# Patient Record
Sex: MALE | Race: WHITE | NOT HISPANIC OR LATINO | Employment: OTHER | URBAN - METROPOLITAN AREA
[De-identification: names, ages, dates, MRNs, and addresses within clinical notes are randomized per-mention and may not be internally consistent; named-entity substitution may affect disease eponyms.]

---

## 2018-07-02 ENCOUNTER — OFFICE VISIT (OUTPATIENT)
Dept: ORTHOPEDICS | Facility: CLINIC | Age: 68
End: 2018-07-02
Payer: MEDICARE

## 2018-07-02 VITALS
SYSTOLIC BLOOD PRESSURE: 156 MMHG | HEIGHT: 71 IN | HEART RATE: 67 BPM | BODY MASS INDEX: 36.4 KG/M2 | WEIGHT: 260 LBS | DIASTOLIC BLOOD PRESSURE: 88 MMHG

## 2018-07-02 DIAGNOSIS — S43.432A SUPERIOR GLENOID LABRUM LESION OF LEFT SHOULDER, INITIAL ENCOUNTER: ICD-10-CM

## 2018-07-02 DIAGNOSIS — M19.012 PRIMARY OSTEOARTHRITIS OF LEFT SHOULDER: Primary | ICD-10-CM

## 2018-07-02 DIAGNOSIS — M75.102 TEAR OF LEFT ROTATOR CUFF, UNSPECIFIED TEAR EXTENT: ICD-10-CM

## 2018-07-02 PROCEDURE — 99204 OFFICE O/P NEW MOD 45 MIN: CPT | Mod: ,,, | Performed by: ORTHOPAEDIC SURGERY

## 2018-07-02 PROCEDURE — 73030 X-RAY EXAM OF SHOULDER: CPT | Mod: LT,,, | Performed by: ORTHOPAEDIC SURGERY

## 2018-07-02 RX ORDER — LISINOPRIL 40 MG/1
40 TABLET ORAL DAILY
COMMUNITY

## 2018-07-02 RX ORDER — OMEPRAZOLE 40 MG/1
40 CAPSULE, DELAYED RELEASE ORAL EVERY MORNING
COMMUNITY

## 2018-07-02 RX ORDER — PREDNISONE 5 MG/1
5 TABLET ORAL
COMMUNITY

## 2018-07-02 NOTE — PROGRESS NOTES
[facility]    Subjective:     Chief Complaint:   Chief Complaint   Patient presents with    Left Shoulder - Pain     Left shoulder pain x 2 weeks. States that he woke up on morning with it. States that he can not lift his arm up. States that he has pain in the back of his shoulder. States that he did have surgery on that arm a few years ago.       Past Medical History:   Diagnosis Date    Arthritis     RA    Depression     Diabetes mellitus, type 2     Hypertension        Past Surgical History:   Procedure Laterality Date    CERVICAL SPINE SURGERY      KNEE SURGERY      removal of abscesses         Current Outpatient Prescriptions   Medication Sig    lisinopril (PRINIVIL,ZESTRIL) 40 MG tablet Take 40 mg by mouth.    methotrexate sodium 2.5 mg DsPk Take 2.5 mg by mouth.    omeprazole (PRILOSEC) 40 MG capsule Take 40 mg by mouth.    predniSONE (DELTASONE) 5 MG tablet Take 5 mg by mouth.     No current facility-administered medications for this visit.        Review of patient's allergies indicates:   Allergen Reactions    Sulfa (sulfonamide antibiotics) Swelling     Swelling of lips and tongue          History reviewed. No pertinent family history.    Social History     Social History    Marital status:      Spouse name: N/A    Number of children: N/A    Years of education: N/A     Occupational History    Not on file.     Social History Main Topics    Smoking status: Current Every Day Smoker    Smokeless tobacco: Not on file    Alcohol use Yes    Drug use: Unknown    Sexual activity: Not on file     Other Topics Concern    Not on file     Social History Narrative    No narrative on file       History of present illness: Left shoulder pain. Now maybe 20 years ago he had a surgery on the left shoulder for an injury. Was doing well last year and really until the last few weeks he started having issues with the left shoulder. Movement overhead is started to cause pain in left  shoulder. No new injury. He is retired. He is right-handed. He has trouble sleeping on the left shoulder at night.      Review of Systems:    Constitution: Negative for chills, fever, and sweats.  Negative for unexplained weight loss.    HENT:  Negative for headaches and blurry vision.    Cardiovascular:Negative for chest pain or irregular heart beat. Negative for hypertension.    Respiratory:  Negative for cough and shortness of breath.    Gastrointestinal: Negative for abdominal pain, heartburn, melena, nausea, and vomitting.    Genitourinary:  Negative bladder incontinence and dysuria.    Musculoskeletal:  See HPI for details.     Neurological: Negative for numbness.    Psychiatric/Behavioral: Negative for depression.  The patient is not nervous/anxious.      Endocrine: Negative for polyuria    Hematologic/Lymphatic: Negative for bleeding problem.  Does not bruise/bleed easily.    Skin: Negative for poor would healing and rash    Objective:      Physical Examination:    Vital Signs:    Vitals:    07/02/18 1021   BP: (!) 156/88   Pulse: 67       Body mass index is 36.26 kg/m².    This a well-developed, well nourished patient in no acute distress.  They are alert and oriented and cooperative to examination.        Physical exam left shoulder shows a prior longitudinal incision over the ADC joint region. He has a mild tenderness at the distal clavicle. He has no significant apprehension pain. He does have crepitation and a painful arc. He also has pain with full forward flexion of the left shoulder.  Pertinent New Results:    XRAY Report / Interpretation:   AP lateral left shoulder shows that he has hardware in the shoulder. Looks like a dog bone type of distal clavicle and coracoid hardware to fix and ADC repair. He also has a single metal screw anchor at the distal end of the clavicle. I do not see any significant step off at the acromioclavicular joint. I do not see superior migration of the humeral head nor  degenerative changes in the glenohumeral joint.Electronically Signed By Elver Dennis JR, MD    Assessment/Plan:      My impression is a old ADC repair left shoulder. New pain in the shoulder which could be SLAP lesion or rotator cuff pain. He does have a mildly positive speed's test on the left and a painful arc. I don't know if with any get a good picture from an MRI so I'd rather do a CT arthrogram to see if we can better delineate the issues with his left shoulder. He needs surgery on the left shoulder      This note was created using Dragon voice recognition software that occasionally misinterpreted phrases or words.

## 2018-07-09 ENCOUNTER — TELEPHONE (OUTPATIENT)
Dept: ORTHOPEDICS | Facility: CLINIC | Age: 68
End: 2018-07-09

## 2018-07-09 NOTE — TELEPHONE ENCOUNTER
----- Message from Lynda Penny LPN sent at 7/9/2018 10:33 AM CDT -----  He suppose to have some type of test. Not sure what and when  Lm with Patient, his CT arthrogram is pending approval

## 2018-07-27 ENCOUNTER — OFFICE VISIT (OUTPATIENT)
Dept: ORTHOPEDICS | Facility: CLINIC | Age: 68
End: 2018-07-27
Payer: MEDICARE

## 2018-07-27 VITALS
DIASTOLIC BLOOD PRESSURE: 73 MMHG | HEIGHT: 71 IN | SYSTOLIC BLOOD PRESSURE: 127 MMHG | WEIGHT: 200 LBS | HEART RATE: 73 BPM | BODY MASS INDEX: 28 KG/M2

## 2018-07-27 DIAGNOSIS — M75.122 COMPLETE TEAR OF LEFT ROTATOR CUFF: Primary | ICD-10-CM

## 2018-07-27 PROCEDURE — 93010 ELECTROCARDIOGRAM REPORT: CPT | Mod: ,,, | Performed by: INTERNAL MEDICINE

## 2018-07-27 PROCEDURE — 93005 ELECTROCARDIOGRAM TRACING: CPT

## 2018-07-27 PROCEDURE — 99213 OFFICE O/P EST LOW 20 MIN: CPT | Mod: ,,, | Performed by: ORTHOPAEDIC SURGERY

## 2018-07-27 NOTE — PROGRESS NOTES
[facility]    Subjective:     Chief Complaint:   Chief Complaint   Patient presents with    Left Shoulder - Pain     Follow up with left shoulder Arthrogram (18). States that his shoulder is still hurting. States that it has not gotten any worse or any better.        Past Medical History:   Diagnosis Date    Arthritis     RA    Depression     Diabetes mellitus, type 2     Hypertension        Past Surgical History:   Procedure Laterality Date    CERVICAL SPINE SURGERY      KNEE SURGERY      removal of abscesses         Current Outpatient Prescriptions   Medication Sig    lisinopril (PRINIVIL,ZESTRIL) 40 MG tablet Take 40 mg by mouth.    methotrexate sodium 2.5 mg DsPk Take 2.5 mg by mouth.    omeprazole (PRILOSEC) 40 MG capsule Take 40 mg by mouth.    predniSONE (DELTASONE) 5 MG tablet Take 5 mg by mouth.     No current facility-administered medications for this visit.        Review of patient's allergies indicates:   Allergen Reactions    Sulfa (sulfonamide antibiotics) Swelling     Swelling of lips and tongue          History reviewed. No pertinent family history.    Social History     Social History    Marital status:      Spouse name: N/A    Number of children: N/A    Years of education: N/A     Occupational History    Not on file.     Social History Main Topics    Smoking status: Current Every Day Smoker    Smokeless tobacco: Not on file    Alcohol use Yes    Drug use: Unknown    Sexual activity: Not on file     Other Topics Concern    Not on file     Social History Narrative    No narrative on file       History of present illness: We have the MRI no  CT arthrogram left shoulder. Shows hardware from prior coracoclavicular ligament reconstruction. Some widening of the ADC joint. There is a full-thickness rotator cuff tear supraspinatus. There may be some mild arthritic changes in the shoulder and he is rheumatoid patient.      Review of  Systems:    Constitution: Negative for chills, fever, and sweats.  Negative for unexplained weight loss.    HENT:  Negative for headaches and blurry vision.    Cardiovascular:Negative for chest pain or irregular heart beat. Negative for hypertension.    Respiratory:  Negative for cough and shortness of breath.    Gastrointestinal: Negative for abdominal pain, heartburn, melena, nausea, and vomitting.    Genitourinary:  Negative bladder incontinence and dysuria.    Musculoskeletal:  See HPI for details.     Neurological: Negative for numbness.    Psychiatric/Behavioral: Negative for depression.  The patient is not nervous/anxious.      Endocrine: Negative for polyuria    Hematologic/Lymphatic: Negative for bleeding problem.  Does not bruise/bleed easily.    Skin: Negative for poor would healing and rash    Objective:      Physical Examination:    Vital Signs:    Vitals:    07/27/18 1217   BP: 127/73   Pulse: 73       Body mass index is 27.89 kg/m².    This a well-developed, well nourished patient in no acute distress.  They are alert and oriented and cooperative to examination.        Physical exam shows she does not have tenderness over the acromioclavicular joint does have a significant painful arc in that  Pertinent New Results:    XRAY Report / Interpretation:       Assessment/Plan:      So our plan I think he has a rotator cuff tear he has some rheumatoid which may be a factor the old coracoclavicular ligament reconstruction I don't think is a direct issue here. Our plan is arthroscopy left shoulder pain that the rotator cuff tear. He wants to proceed with that history physical and consent for arthroscopy with rotator cuff repair left shoulder today.      This note was created using Dragon voice recognition software that occasionally misinterpreted phrases or words.

## 2018-07-30 ENCOUNTER — HOSPITAL ENCOUNTER (OUTPATIENT)
Dept: RADIOLOGY | Facility: HOSPITAL | Age: 68
Discharge: HOME | End: 2018-07-30
Attending: ORTHOPAEDIC SURGERY
Payer: MEDICARE

## 2018-07-30 ENCOUNTER — ANESTHESIA EVENT (OUTPATIENT)
Dept: SURGERY | Facility: HOSPITAL | Age: 68
End: 2018-07-30

## 2018-07-30 ENCOUNTER — TELEPHONE (OUTPATIENT)
Dept: ORTHOPEDICS | Facility: CLINIC | Age: 68
End: 2018-07-30

## 2018-07-30 ENCOUNTER — HOSPITAL ENCOUNTER (OUTPATIENT)
Dept: PREADMISSION TESTING | Facility: HOSPITAL | Age: 68
Discharge: HOME | End: 2018-07-30
Attending: ORTHOPAEDIC SURGERY
Payer: MEDICARE

## 2018-07-30 VITALS — BODY MASS INDEX: 28.02 KG/M2 | WEIGHT: 200.13 LBS | HEIGHT: 71 IN

## 2018-07-30 DIAGNOSIS — M75.122 COMPLETE TEAR OF LEFT ROTATOR CUFF: ICD-10-CM

## 2018-07-30 LAB
ANION GAP SERPL CALC-SCNC: 7 MMOL/L
BASOPHILS # BLD AUTO: 0 K/UL
BASOPHILS NFR BLD: 0.1 %
BUN SERPL-MCNC: 23 MG/DL
CALCIUM SERPL-MCNC: 9.4 MG/DL
CHLORIDE SERPL-SCNC: 100 MMOL/L
CO2 SERPL-SCNC: 25 MMOL/L
CREAT SERPL-MCNC: 1.6 MG/DL
DIFFERENTIAL METHOD: ABNORMAL
EOSINOPHIL # BLD AUTO: 0.1 K/UL
EOSINOPHIL NFR BLD: 0.8 %
ERYTHROCYTE [DISTWIDTH] IN BLOOD BY AUTOMATED COUNT: 14.2 %
EST. GFR  (AFRICAN AMERICAN): 50 ML/MIN/1.73 M^2
EST. GFR  (NON AFRICAN AMERICAN): 43 ML/MIN/1.73 M^2
GIANT PLATELETS BLD QL SMEAR: PRESENT
GLUCOSE SERPL-MCNC: 382 MG/DL
HCT VFR BLD AUTO: 41 %
HGB BLD-MCNC: 13.6 G/DL
LYMPHOCYTES # BLD AUTO: 1.5 K/UL
LYMPHOCYTES NFR BLD: 11.2 %
MCH RBC QN AUTO: 31.1 PG
MCHC RBC AUTO-ENTMCNC: 33.1 G/DL
MCV RBC AUTO: 94 FL
MONOCYTES # BLD AUTO: 0.7 K/UL
MONOCYTES NFR BLD: 5.4 %
NEUTROPHILS # BLD AUTO: 10.9 K/UL
NEUTROPHILS NFR BLD: 82.5 %
PLATELET # BLD AUTO: 136 K/UL
PLATELET BLD QL SMEAR: ABNORMAL
PMV BLD AUTO: 10.3 FL
POTASSIUM SERPL-SCNC: 4.7 MMOL/L
RBC # BLD AUTO: 4.37 M/UL
SODIUM SERPL-SCNC: 132 MMOL/L
WBC # BLD AUTO: 13.2 K/UL

## 2018-07-30 PROCEDURE — 71046 X-RAY EXAM CHEST 2 VIEWS: CPT | Mod: 26,,, | Performed by: RADIOLOGY

## 2018-07-30 PROCEDURE — 80048 BASIC METABOLIC PNL TOTAL CA: CPT

## 2018-07-30 PROCEDURE — 36415 COLL VENOUS BLD VENIPUNCTURE: CPT

## 2018-07-30 PROCEDURE — 99900103 DSU ONLY-NO CHARGE-INITIAL HR (STAT)

## 2018-07-30 PROCEDURE — 71046 X-RAY EXAM CHEST 2 VIEWS: CPT | Mod: TC,FY

## 2018-07-30 PROCEDURE — 99900104 DSU ONLY-NO CHARGE-EA ADD'L HR (STAT)

## 2018-07-30 PROCEDURE — 85025 COMPLETE CBC W/AUTO DIFF WBC: CPT

## 2018-07-30 RX ORDER — METFORMIN HYDROCHLORIDE 500 MG/1
500 TABLET ORAL DAILY
COMMUNITY

## 2018-07-30 NOTE — TELEPHONE ENCOUNTER
Spoke with pt and explained his surgery has been canceled by Dr. Dennis for tomorrow due to his glucose of 384 and WBC 13.2. He will need to see his PCP and get a clearance before he can be put back on the surgery schedule

## 2018-07-30 NOTE — ANESTHESIA PREPROCEDURE EVALUATION
07/30/2018  Israel Perdue is a 67 y.o., male.    Pre-op Assessment    I have reviewed the Patient Summary Reports.     I have reviewed the Nursing Notes.   I have reviewed the Medications.     Review of Systems

## 2018-07-30 NOTE — TELEPHONE ENCOUNTER
----- Message from Arlene Kwon sent at 7/30/2018  2:10 PM CDT -----  Contact: franki  Patient called is returning her call [phone]

## 2018-07-31 ENCOUNTER — ANESTHESIA (OUTPATIENT)
Dept: SURGERY | Facility: HOSPITAL | Age: 68
End: 2018-07-31

## 2019-04-09 ENCOUNTER — OFFICE VISIT (OUTPATIENT)
Dept: RHEUMATOLOGY | Facility: CLINIC | Age: 69
End: 2019-04-09
Payer: COMMERCIAL

## 2019-04-09 VITALS
BODY MASS INDEX: 27.81 KG/M2 | SYSTOLIC BLOOD PRESSURE: 133 MMHG | DIASTOLIC BLOOD PRESSURE: 76 MMHG | WEIGHT: 199.38 LBS

## 2019-04-09 DIAGNOSIS — M19.90 OSTEOARTHRITIS, UNSPECIFIED OSTEOARTHRITIS TYPE, UNSPECIFIED SITE: ICD-10-CM

## 2019-04-09 DIAGNOSIS — I10 HYPERTENSION, UNSPECIFIED TYPE: ICD-10-CM

## 2019-04-09 DIAGNOSIS — E13.8 DIABETES MELLITUS OF OTHER TYPE WITH COMPLICATION, UNSPECIFIED WHETHER LONG TERM INSULIN USE: ICD-10-CM

## 2019-04-09 DIAGNOSIS — M05.79 RHEUMATOID ARTHRITIS INVOLVING MULTIPLE SITES WITH POSITIVE RHEUMATOID FACTOR: Primary | ICD-10-CM

## 2019-04-09 DIAGNOSIS — J44.9 CHRONIC OBSTRUCTIVE PULMONARY DISEASE, UNSPECIFIED COPD TYPE: ICD-10-CM

## 2019-04-09 DIAGNOSIS — Z79.899 ENCOUNTER FOR LONG-TERM (CURRENT) DRUG USE: ICD-10-CM

## 2019-04-09 LAB
ALBUMIN SERPL-MCNC: 3.9 G/DL (ref 3.1–4.7)
ALP SERPL-CCNC: 53 IU/L (ref 40–104)
ALT (SGPT): 14 IU/L (ref 3–33)
AST SERPL-CCNC: 19 IU/L (ref 10–40)
BASOPHILS NFR BLD: 0 K/UL (ref 0–0.2)
BASOPHILS NFR BLD: 0.4 %
BILIRUB SERPL-MCNC: 0.5 MG/DL (ref 0.3–1)
BUN SERPL-MCNC: 32 MG/DL (ref 8–20)
CALCIUM SERPL-MCNC: 9.3 MG/DL (ref 7.7–10.4)
CHLORIDE: 106 MMOL/L (ref 98–110)
CK SERPL-CCNC: 82 IU/L (ref 18–170)
CO2 SERPL-SCNC: 18.3 MMOL/L (ref 22.8–31.6)
CREATININE: 1.31 MG/DL (ref 0.6–1.4)
CRP SERPL-MCNC: 0.2 MG/DL (ref 0–1.4)
EOSINOPHIL NFR BLD: 0.2 K/UL (ref 0–0.7)
EOSINOPHIL NFR BLD: 1.5 %
ERYTHROCYTE [DISTWIDTH] IN BLOOD BY AUTOMATED COUNT: 13.6 % (ref 11.7–14.9)
GLUCOSE: 123 MG/DL (ref 70–99)
GRAN #: 8.1 K/UL (ref 1.4–6.5)
GRAN%: 76.6 %
HCT VFR BLD AUTO: 41.8 % (ref 39–55)
HGB BLD-MCNC: 14.2 G/DL (ref 14–16)
IMMATURE GRANS (ABS): 0.1 K/UL (ref 0–1)
IMMATURE GRANULOCYTES: 0.8 %
LYMPH #: 1.5 K/UL (ref 1.2–3.4)
LYMPH%: 14.2 %
MCH RBC QN AUTO: 31.1 PG (ref 25–35)
MCHC RBC AUTO-ENTMCNC: 34 G/DL (ref 31–36)
MCV RBC AUTO: 91.5 FL (ref 80–100)
MONO #: 0.7 K/UL (ref 0.1–0.6)
MONO%: 6.5 %
NUCLEATED RBCS: 0 %
PLATELET # BLD AUTO: 123 K/UL (ref 140–440)
PMV BLD AUTO: 11.9 FL (ref 8.8–12.7)
POTASSIUM SERPL-SCNC: 5.1 MMOL/L (ref 3.5–5)
PROT SERPL-MCNC: 7.8 G/DL (ref 6–8.2)
RBC # BLD AUTO: 4.57 M/UL (ref 4.3–5.9)
SODIUM: 134 MMOL/L (ref 134–144)
URATE SERPL-MCNC: 5.4 MG/DL (ref 2.6–7.8)
WBC # BLD AUTO: 10.6 K/UL (ref 5–10)

## 2019-04-09 PROCEDURE — 99204 PR OFFICE/OUTPT VISIT, NEW, LEVL IV, 45-59 MIN: ICD-10-PCS | Mod: ,,, | Performed by: INTERNAL MEDICINE

## 2019-04-09 PROCEDURE — 99204 OFFICE O/P NEW MOD 45 MIN: CPT | Mod: ,,, | Performed by: INTERNAL MEDICINE

## 2019-04-09 RX ORDER — ALLOPURINOL 100 MG/1
100 TABLET ORAL
COMMUNITY

## 2019-04-09 RX ORDER — SIMVASTATIN 10 MG/1
80 TABLET, FILM COATED ORAL
COMMUNITY

## 2019-04-09 RX ORDER — FOLIC ACID 1 MG/1
1 TABLET ORAL DAILY
COMMUNITY

## 2019-04-09 RX ORDER — ACETAMINOPHEN 500 MG
500 TABLET ORAL EVERY 6 HOURS PRN
COMMUNITY

## 2019-04-09 RX ORDER — TOPIRAMATE 100 MG/1
TABLET, FILM COATED ORAL
Refills: 5 | COMMUNITY
Start: 2019-04-02

## 2019-04-09 NOTE — PROGRESS NOTES
[facility] R[facility]OLOG[facility]           New patient visit    Notes dictated via Dragon to EPIC. Please forgive any unintentional errors.  Subjective:       Patient ID:   NAME: Israel Perdue : 1950     68 y.o. male    [facility]  Other Physicians:    Chief Complaint:  Rheumatoid Arthritis        HPI:  This patient was diagnosed by my partner approximately 6 years ago with rheumatoid arthritis. He was placed on methotrexate o which she was partially responsive. At his last visit on 2019 is methotrexate was increased to 25 mg weekly. At that point, the patient was lost to follow-up. He tells me that [facility] required [facility] in [place]. He states that his medication has never been changed & that he remains on methotrexate 25 mg weekly, prednisone 10 mg daily and folate supplementation. At present, he complains only of occasional swelling of the wrist, generally related to overuse. He has brief morning stiffness that is more suggestive of gelling than of active rheumatoid. He is able to perform all of his chores, hobbies, and ADLs without assistance.    His past medical history is positive for hypertension, hyperlipidemia, diabetes, gout, and reflux. He has been a lifelong smoker and acknowledges a likelihood that he has underlying lung disease though he has not sought treatment nor has he been referred for evaluation.      ROS:   GEN:      no fever, night sweats or weight loss  SKIN:     no rashes , erythema, bruising, or swelling, no Raynauds, no photosensitivity  HEENT:  no acute changes in vision, no mouth ulcers, no sicca symptoms, no scalp tenderness, jaw claudication.  CV:        no CP, PND, + NAGELES, no orthopnea, no palpitations  PULM:   no SOB, + productive cough, - hemoptysis, + AM yellowish sputum, no pleuritic pain  GI:          No dysphagia, + GERD, no abdominal pain, nausea, vomiting, constipation, diarrhea, melanotic stools, BRBPR, or  hematemesis.  :        no hematuria, dysuria. No STDs  NEURO: no paresthesias, headaches, visual disturbances, muscle weakness  MUSCULOSKELETAL:  Joint pain and stiffness with occasional swelling of the right wrist only  PSYCH: +/- insomnia, + depression > anxiety    Medications:    Current Outpatient Medications:     acetaminophen (TYLENOL) 500 MG tablet, Take 500 mg by mouth every 6 (six) hours as needed for Pain., Disp: , Rfl:     allopurinol (ZYLOPRIM) 100 MG tablet, Take 100 mg by mouth., Disp: , Rfl:     folic acid (FOLVITE) 1 MG tablet, Take 1 mg by mouth once daily., Disp: , Rfl:     lisinopril (PRINIVIL,ZESTRIL) 40 MG tablet, Take 40 mg by mouth once daily. , Disp: , Rfl:     metFORMIN (GLUCOPHAGE) 500 MG tablet, Take 500 mg by mouth once daily., Disp: , Rfl:     methotrexate sodium 2.5 mg DsPk, Take 2.5 mg by mouth once a week. , Disp: , Rfl:     omeprazole (PRILOSEC) 40 MG capsule, Take 40 mg by mouth every morning. , Disp: , Rfl:     predniSONE (DELTASONE) 5 MG tablet, Take 5 mg by mouth., Disp: , Rfl:     simvastatin (ZOCOR) 10 MG tablet, Take 10 mg by mouth., Disp: , Rfl:     topiramate (TOPAMAX) 100 MG tablet, , Disp: , Rfl: 5    FAMILY HISTORY: negative for Connective Tissue Disease        Review of patient's allergies indicates:   Allergen Reactions    Sulfa (sulfonamide antibiotics) Swelling     Swelling of lips and tongue       Duricef [cefadroxil]              Objective:     Vitals:  Blood pressure 133/76, weight 90.4 kg (199 lb 6.4 oz).    Physical Examination:   GEN:     wn/wd male in no apparent distress  SKIN:    no rashes, no sclerodactyly, no Raynaud's, no periungual erythema, no digital tip tapering, no nailbed pitting  HEAD:   no alopecia, no scalp tenderness, no temporal artery tenderness or induration.  EYES:   no pallor, no icterus, PERRLA  ENT:     no thrush, no mucosal dryness or ulcerations, poor oral hygiene & dentition.  NECK:  supple x 6, no masses, no thyromegaly,  no lymphadenopathy.  CV:        S1 and S2 regular, no murmurs, gallop or rubs  CHEST: Normal respiratory effort;  Coarse biphasic rhonchi and expiratory wheezing diffusely. No signs of consolidation.  ABD:      non-tender and non-distended; soft; normal bowel sounds; no rebound, guarding, or tenderness. No hepatosplenomegaly.  Musculoskeletal:  Low-grade synovial proliferation is noted in the right wrist and in the right second, third, and fourth MCP joints and in the right third PIP joint. Mild evidence of chronic proliferation is noted in the left second and third MCP joints only. Range of motion of both hands is well preserved.  strength is normal. There is no evidence of neurovascular compromise. Here is no evidence of active synovitis or a significant deformity of the large joints. Degenerative changes however are noted in both knees. Muscle strength is adequate. Range of motion of the back is limited by deconditioning. Posture is acceptable.The gait is stable  EXTREM: no clubbing, cyanosis or edema. normal pulses.  NEURO:  grossly intact; motor/sensory WNL; no tremors  PSYCH:  normal mood, affect and behavior            Labs:   Lab Results   Component Value Date    WBC 13.20 (H) 07/30/2018    HGB 13.6 (L) 07/30/2018    HCT 41.0 07/30/2018    MCV 94 07/30/2018     (L) 07/30/2018   CMP@  Sodium   Date Value Ref Range Status   07/30/2018 132 (L) 136 - 145 mmol/L Final     Potassium   Date Value Ref Range Status   07/30/2018 4.7 3.5 - 5.1 mmol/L Final     Chloride   Date Value Ref Range Status   07/30/2018 100 95 - 110 mmol/L Final     CO2   Date Value Ref Range Status   07/30/2018 25 23 - 29 mmol/L Final     Glucose   Date Value Ref Range Status   07/30/2018 382 (H) 70 - 110 mg/dL Final     BUN, Bld   Date Value Ref Range Status   07/30/2018 23 8 - 23 mg/dL Final     Creatinine   Date Value Ref Range Status   07/30/2018 1.6 (H) 0.5 - 1.4 mg/dL Final     Calcium   Date Value Ref Range Status    07/30/2018 9.4 8.7 - 10.5 mg/dL Final         Radiology/Diagnostic Studies:    none    Assessment/Discussion/Plan:   68 y.o. male with chronic rheumatoid arthritis on long-term full dose methotrexate and prednisone 10 mg daily- smoldering  2) HTN- presently controlled lisinopril 40 mg daily  3) DM- on metformin, no recent labs available  4) COPD with continuous tobacco abuse    PLAN:  I will continue his medication unchanged. I have ordered all appropriate blood testing. I will consider adjusting his regimen in part based on those results. He is probably a good candidate for biologics.   I have counseled him on the risk of severe lung damage from continued smoking In particular, we discussed Rheumatoid Lung. I have also asked him to consider seeing a pulmonologist. I will press the issue at the next visit.      RTC:  I will see him back in 2-3 months.      Electronically signed by Arvin Jorge MD

## 2019-04-09 NOTE — LETTER
April 9, 2019      [facility]  [address] [facility] [address]           [facility] - Rheumatology  [address] [address]  Phone: [phone]  Fax: [phone]          Patient: Israel Perdue   MR Number: [id number]   YOB: 1950   Date of Visit: 4/9/2019       Dear [facility]:    Thank you for referring Israel Perdue to me for evaluation. Attached you will find relevant portions of my assessment and plan of care.    If you have questions, please do not hesitate to call me. I look forward to following Israel Perdue along with you.    Sincerely,    Arvin Jorge MD    Enclosure  CC:  No Recipients    If you would like to receive this communication electronically, please contact [email or web address] or [phone] to request more information on [employer] Link access.    For providers and/or their staff who would like to refer a patient to [facility], please contact us through our one-stop-shop provider referral line, [facility] , at [phone].    If you feel you have received this communication in error or would no longer like to receive these types of communications, please e-mail [email or web address]

## 2019-04-11 LAB — RHEUMATOID FACT SERPL-ACNC: 181.4 IU/ML (ref 0–13.9)

## 2019-04-12 LAB
CCP ANTIBODIES IGG/IGA: 154 UNITS (ref 0–19)
MISCELLANEOUS LAB TEST ORDER: NORMAL

## 2022-08-29 ENCOUNTER — TELEPHONE (OUTPATIENT)
Dept: PAIN MEDICINE | Facility: CLINIC | Age: 72
End: 2022-08-29
Payer: MEDICARE

## 2022-08-29 ENCOUNTER — OFFICE VISIT (OUTPATIENT)
Dept: NEUROSURGERY | Facility: CLINIC | Age: 72
End: 2022-08-29
Payer: MEDICARE

## 2022-08-29 VITALS — HEART RATE: 70 BPM | SYSTOLIC BLOOD PRESSURE: 160 MMHG | DIASTOLIC BLOOD PRESSURE: 74 MMHG

## 2022-08-29 DIAGNOSIS — M54.16 LUMBAR RADICULOPATHY: ICD-10-CM

## 2022-08-29 DIAGNOSIS — M48.062 LUMBAR STENOSIS WITH NEUROGENIC CLAUDICATION: ICD-10-CM

## 2022-08-29 DIAGNOSIS — M51.36 DDD (DEGENERATIVE DISC DISEASE), LUMBAR: Primary | ICD-10-CM

## 2022-08-29 DIAGNOSIS — M43.17 ACQUIRED SPONDYLOLISTHESIS OF LUMBOSACRAL REGION: ICD-10-CM

## 2022-08-29 PROCEDURE — 1125F PR PAIN SEVERITY QUANTIFIED, PAIN PRESENT: ICD-10-PCS | Mod: CPTII,S$GLB,, | Performed by: NEUROLOGICAL SURGERY

## 2022-08-29 PROCEDURE — 1101F PR PT FALLS ASSESS DOC 0-1 FALLS W/OUT INJ PAST YR: ICD-10-PCS | Mod: CPTII,S$GLB,, | Performed by: NEUROLOGICAL SURGERY

## 2022-08-29 PROCEDURE — 3077F PR MOST RECENT SYSTOLIC BLOOD PRESSURE >= 140 MM HG: ICD-10-PCS | Mod: CPTII,S$GLB,, | Performed by: NEUROLOGICAL SURGERY

## 2022-08-29 PROCEDURE — 3288F FALL RISK ASSESSMENT DOCD: CPT | Mod: CPTII,S$GLB,, | Performed by: NEUROLOGICAL SURGERY

## 2022-08-29 PROCEDURE — 3077F SYST BP >= 140 MM HG: CPT | Mod: CPTII,S$GLB,, | Performed by: NEUROLOGICAL SURGERY

## 2022-08-29 PROCEDURE — 3078F DIAST BP <80 MM HG: CPT | Mod: CPTII,S$GLB,, | Performed by: NEUROLOGICAL SURGERY

## 2022-08-29 PROCEDURE — 1125F AMNT PAIN NOTED PAIN PRSNT: CPT | Mod: CPTII,S$GLB,, | Performed by: NEUROLOGICAL SURGERY

## 2022-08-29 PROCEDURE — 3288F PR FALLS RISK ASSESSMENT DOCUMENTED: ICD-10-PCS | Mod: CPTII,S$GLB,, | Performed by: NEUROLOGICAL SURGERY

## 2022-08-29 PROCEDURE — 1101F PT FALLS ASSESS-DOCD LE1/YR: CPT | Mod: CPTII,S$GLB,, | Performed by: NEUROLOGICAL SURGERY

## 2022-08-29 PROCEDURE — 99204 OFFICE O/P NEW MOD 45 MIN: CPT | Mod: S$GLB,,, | Performed by: NEUROLOGICAL SURGERY

## 2022-08-29 PROCEDURE — 99204 PR OFFICE/OUTPT VISIT, NEW, LEVL IV, 45-59 MIN: ICD-10-PCS | Mod: S$GLB,,, | Performed by: NEUROLOGICAL SURGERY

## 2022-08-29 PROCEDURE — 3078F PR MOST RECENT DIASTOLIC BLOOD PRESSURE < 80 MM HG: ICD-10-PCS | Mod: CPTII,S$GLB,, | Performed by: NEUROLOGICAL SURGERY

## 2022-08-29 NOTE — TELEPHONE ENCOUNTER
LVM to schedule direct procedure from Dr. Hernandez.       Okay to schedule.       ----- Message -----   From: Kat Owens   Sent: 2022   2:30 PM CDT   To: Tamanna Gan MD   Subject: FW: Order for SHAWN FARRELL                  Ok to schedule ?    ----- Message -----   From: Chante Hernandez DO   Sent: 2022   2:17 PM CDT   To: [facility] Pain Management Schedulers   Subject: Order for SHAWN FARRELL                         Patient Name: SHAWN FARRELL([id number])   Sex: Male   : 1950        PCP: TONY TERESA     Center: [facility]       Types of orders made on 2022: Neurology, Procedure Request      Order Date:2022   Ordering User:CHANTE HERNANDEZ [[id number]   Encounter Provider:Chante Hernandez DO [[id number]]   Authorizing JANICE selfder: Chante Hernandez DO [[id number]]   Department:[facility] NEUROSURGERY[[id number]]      Common Order Information   Procedure -> Epidural Injection (specify level)       Order Specific Information   Order: Procedure Order to Pain Management [Custom: [id number]]  Order #:           [id number]: 1 FUTURE     Priority: Routine  Class: Clinic Performed     Future Order Information       Expires on:2023             Z1   Expected by:2022                    Associated Diagnoses       M51.36 DDD (degenerative disc disease), lumbar       M54.16 Lumbar radiculopathy       M43.17 Acquired spondylolisthesis of lumbosacral region       Physician -> Dr. Gan Cmt: L5-S1 SYDNEY. If no relief, then Right L3-4, L4-5                  TESi          Facility Name: -> [place]               Priority: Routine  Class: Clinic Performed     Future Order Information          Expires on:2023            Expected by:2022                    Associated Diagnoses       M51.36 DDD (degenerative disc disease), lumbar       M54.16 Lumbar radiculopathy       M43.17 Acquired spondylolisthesis of lumbosacral region       Procedure -> Epidural Injection (specify level)            Physician -> Dr. Gan Cmt: L5-S1 SYDNEY. If no relief, then Right L3-4, L4-5                  TESi          Facility Name: -> [facility]

## 2022-08-29 NOTE — PROGRESS NOTES
HPI:  This is a very pleasant 71-year-old gentleman who presents with right lumbosacral pain with radiation into the right buttock, groin, medial aspect of the anterior thigh into the knee.  He describes aching burning pain worse with walking or standing.  His symptoms began abruptly in November 2021 after getting out of bed.  He endorses difficulty getting out of a car or climbing stairs due to proximal right leg pain with weakness.  He underwent physical therapy which made his symptoms worse.  He denies weakness, numbness, saddle anesthesia, sphincter dysfunction.    Smoking status:  Active smoker  Past Medical History:   Diagnosis Date    Arthritis     RA    Depression     Diabetes mellitus, type 2     Hypertension     Presence of dental bridge     upper and lower      Past Surgical History:   Procedure Laterality Date    CERVICAL SPINE SURGERY      KNEE SURGERY      removal of abscesses       Social History     Tobacco Use    Smoking status: Every Day     Packs/day: 0.75     Types: Cigarettes    Smokeless tobacco: Never   Substance Use Topics    Alcohol use: Yes    Drug use: No       Review of Systems: All systems reviewed and are as above or otherwise negative.    General: well developed, well nourished, no distress.   Head: normocephalic, atraumatic  Eyes: pupils equal, round, reactive to light with accomodation, EOMI.   Neck: trachea midline. No JVD  Cardiovascular: No LE edema   Pulmonary: normal respirations, no signs of respiratory distress  Abdomen: soft, non-distended, not tender to palpation  Sensory: intact to light touch throughout  Extremities: No bruising, deformity  Skin: Skin is warm, dry and intact.    Motor Strength: Moves all extremities spontaneously with good tone.  Full strength upper and lower extremities. No abnormal movements seen.     Strength  Deltoids Triceps Biceps Wrist Extension Wrist Flexion Hand    Upper: R 5/5 5/5 5/5 5/5 5/5 5/5    L 5/5 5/5 5/5 5/5 5/5 5/5     Iliopsoas  Quadriceps Knee  Flexion Tibialis  anterior Gastro- cnemius EHL   Lower: R 5/5 5/5 5/5 5/5 5/5 5/5    L 5/5 5/5 5/5 5/5 5/5 5/5     Neurologic: Alert and oriented. Thought content appropriate.  GCS: Motor: 6/Verbal: 5/Eyes: 4 GCS Total: 15  Mental Status: Awake, Alert, Oriented x 4  Language: No aphasia  Speech: No dysarthria  Cranial nerves: face symmetric, tongue midline, CN II-XII grossly intact.     Pronator Drift: no drift noted  Finger-to-nose: Intact bilaterally  DTR's: 2 + and symmetric in UE and LE  Goyal: absent  Clonus: absent  Babinski: absent    Gait: normal    Tandem Gait: No difficulty           Able to walk on heels & toes    Cervical Spine  ROM: full    Nontender to palpation    Lumbar Spine   ROM: full   Nontender to palpation    Pain on Hip ROM: Negative  Straight leg raise: negative bilaterally     SI Joint tenderness: Negative bilaterally   JONNATHAN: Negative bilaterally  Thigh Thrust: Negative bilaterally  Gaenslen: Negative bilaterally    Significant Exam Findings:  Right iliopsoas graded 4/5.  Diminished right patellar reflex. Tender to palpation right lumbosacral region    Significant Radiology Findings:  I reviewed CT lumbar June 6, 2022 and MRI lumbar March 2022 both performed at outside facilities.  Advanced degenerative disc disease L3-4 L5-S1 with coronal scoliosis.  Near disc space collapse at L3-4 and L5-S1.  Bony bridging evident right L3 and L4 vertebral bodies.  Grade 1 spondylolisthesis L4 on 5.  Severe bilateral foraminal stenosis L3-4.  Moderate canal or foraminal stenosis L4-5.  Large right paracentral disc protrusion L5-S1 with impingement of the traversing right S1 nerve root.    Analysis:  He will likely benefit from surgical intervention.  I ordered a EMG nerve conductions of the lower extremities.  I also recommended sequential interventional pain procedures with Dr. Gan for both therapeutic and diagnostic purposes.  He endorses primarily pain in the lumbosacral junction  which correlates best with the degenerative disc disease without bony bridging at L5-S1.  His radicular symptoms are more consistent with the pathology at L3-4 and/or L4-5.  Consequently, I initially recommended a right L5-S1 interlaminar SYDNEY.  If no relief, follow with right L3-4 and L4-5 transforaminal epidural steroid injection.   Given the extensive pathology, my goal would be to identify the most likely source of his symptoms followed by a limited surgical procedure.  I did explain that decompression only at L4-5 may lead worsened spondylolisthesis and associated axial back pain.  He voiced understanding and is agreement with the above recommendations.  I will see him back in 4-6 weeks for re-evaluation.      [unfilled]   Israel was seen today for back pain and leg pain.    Diagnoses and all orders for this visit:    DDD (degenerative disc disease), lumbar  -     Procedure Order to Pain Management; Future  -     EMG W/ ULTRASOUND AND NERVE CONDUCTION TEST 2 Extremities; Future    Lumbar stenosis with neurogenic claudication    Lumbar radiculopathy  -     Procedure Order to Pain Management; Future  -     EMG W/ ULTRASOUND AND NERVE CONDUCTION TEST 2 Extremities; Future    Acquired spondylolisthesis of lumbosacral region  -     Procedure Order to Pain Management; Future  -     EMG W/ ULTRASOUND AND NERVE CONDUCTION TEST 2 Extremities; Future         Follow up in about 4 weeks (around 9/26/2022).

## 2022-08-30 ENCOUNTER — TELEPHONE (OUTPATIENT)
Dept: PAIN MEDICINE | Facility: CLINIC | Age: 72
End: 2022-08-30
Payer: MEDICARE

## 2022-08-30 DIAGNOSIS — M51.36 DDD (DEGENERATIVE DISC DISEASE), LUMBAR: Primary | ICD-10-CM

## 2022-08-30 NOTE — TELEPHONE ENCOUNTER
----- Message from Tomas Peña sent at 8/30/2022  9:16 AM CDT -----  Contact: pt at [phone]  Type:  Patient Returning Call    Who Called:  pt  Who Left Message for Patient:  Kat  Does the patient know what this is regarding?:  yes  Best Call Back Number:  [phone]    Additional Information:  Please call back and advise.

## 2022-09-12 ENCOUNTER — TELEPHONE (OUTPATIENT)
Dept: PHYSICAL MEDICINE AND REHAB | Facility: CLINIC | Age: 72
End: 2022-09-12
Payer: MEDICARE

## 2022-09-12 NOTE — TELEPHONE ENCOUNTER
----- Message from [place] sent at 9/12/2022 12:42 PM CDT -----  Contact: Pt  Type:  Patient Returning Call    Who Called: Pt  Who Left Message for Patient:  Kat  Does the patient know what this is regarding?: Yes an appt r/s  Best Call Back Number:  [phone]    Additional Information: Pt was returning call they missed pt stated to please call back when available Thank you  Please Advise- Thank you

## 2022-09-20 ENCOUNTER — OFFICE VISIT (OUTPATIENT)
Dept: PHYSICAL MEDICINE AND REHAB | Facility: CLINIC | Age: 72
End: 2022-09-20
Payer: MEDICARE

## 2022-09-20 ENCOUNTER — TELEPHONE (OUTPATIENT)
Dept: PAIN MEDICINE | Facility: CLINIC | Age: 72
End: 2022-09-20
Payer: MEDICARE

## 2022-09-20 DIAGNOSIS — M51.36 DDD (DEGENERATIVE DISC DISEASE), LUMBAR: ICD-10-CM

## 2022-09-20 DIAGNOSIS — M54.16 LUMBAR RADICULOPATHY: ICD-10-CM

## 2022-09-20 DIAGNOSIS — M43.17 ACQUIRED SPONDYLOLISTHESIS OF LUMBOSACRAL REGION: ICD-10-CM

## 2022-09-20 PROCEDURE — 99499 UNLISTED E&M SERVICE: CPT | Mod: S$GLB,,, | Performed by: PHYSICAL MEDICINE & REHABILITATION

## 2022-09-20 PROCEDURE — 95886 MUSC TEST DONE W/N TEST COMP: CPT | Mod: S$GLB,,, | Performed by: PHYSICAL MEDICINE & REHABILITATION

## 2022-09-20 PROCEDURE — 95910 PR NERVE CONDUCTION STUDY; 7-8 STUDIES: ICD-10-PCS | Mod: S$GLB,,, | Performed by: PHYSICAL MEDICINE & REHABILITATION

## 2022-09-20 PROCEDURE — 95910 NRV CNDJ TEST 7-8 STUDIES: CPT | Mod: S$GLB,,, | Performed by: PHYSICAL MEDICINE & REHABILITATION

## 2022-09-20 PROCEDURE — 99499 NO LOS: ICD-10-PCS | Mod: S$GLB,,, | Performed by: PHYSICAL MEDICINE & REHABILITATION

## 2022-09-20 PROCEDURE — 95886 PR EMG COMPLETE, W/ NERVE CONDUCTION STUDIES, 5+ MUSCLES: ICD-10-PCS | Mod: S$GLB,,, | Performed by: PHYSICAL MEDICINE & REHABILITATION

## 2022-09-20 NOTE — TELEPHONE ENCOUNTER
----- Message from Amanda Sousa, Patient Care Assistant sent at 9/20/2022  3:39 PM CDT -----  Regarding: appointment  Contact: pt  Type: Needs Medical Advice  Who Called:  pt   Best Call Back Number: [phone] (home)     Additional Information: pt states she would like a callback regarding cancelling his appointment on 9/26/22. Thanks!         No

## 2022-09-20 NOTE — PROGRESS NOTES
[facility]  Physical Medicine and Rehabilitation   [address] [place] [address], [address]  [place] [address]             Patient: Israel Perdue   Patient ID: [id number]   Sex:     Date of Birth:     Age:     Notes:     Last visit date: 9/20/2022         Visit date and time: 9/20/2022 13:01   Patient Age on Visit Date:     Referring Physician:     Diagnoses:       Leg pain      Sensory NCS      Nerve / Sites Rec. Site Onset Lat Peak Lat Ref. NP Amp Ref. PP Amp Ref. Segments Distance Velocity     ms ms ms µV µV µV µV  cm m/s   R Sural - Ankle (Calf)      Calf Ankle 3.59 4.48 ?4.20 4.4 ?5.0 14.8 ?5.0 Calf - Ankle 14 39   L Sural - Ankle (Calf)      Calf Ankle NR NR ?4.20 NR ?5.0 NR ?5.0 Calf - Ankle 14 NR   R Superficial peroneal - Ankle      Lat leg Ankle 2.97 3.85 ?4.40 5.2 ?5.0 7.8 ?5.0 Lat leg - Ankle 14 47   L Superficial peroneal - Ankle      Lat leg Ankle NR NR ?4.40 NR ?5.0 NR ?5.0 Lat leg - Ankle 14 NR       Motor NCS      Nerve / Sites Muscle Latency Ref. Amplitude Ref. Amp % Duration Segments Distance Lat Diff Velocity Ref.     ms ms mV mV % ms  cm ms m/s m/s   R Peroneal - EDB      Ankle EDB 4.84 ?6.20 1.1 ?2.0 100 6.41 Ankle - EDB 8         Fib head EDB 15.00  1.1  101  Fib head - Ankle 35 10.16 34 ?39      Pop fossa EDB 17.14      Pop fossa - Fib head 10 2.14 47 ?39   L Peroneal - EDB      Ankle EDB 4.69 ?6.20 0.6 ?2.0 100 6.15 Ankle - EDB 8         Fib head EDB 15.63  0.6  97.1  Fib head - Ankle 33 10.94 30 ?39      Pop fossa EDB 17.60      Pop fossa - Fib head 10 1.98 51 ?39   R Tibial - AH      Ankle AH 4.48 ?6.00 0.7 ?3.0 100 7.86 Ankle - AH 8         Pop fossa AH       Pop fossa - Ankle    ?39   L Tibial - AH      Ankle AH 4.69 ?6.00 2.3 ?3.0 100 6.15 Ankle - AH 8         Pop fossa AH 14.84      Pop fossa - Ankle 33 10.16 32 ?39       F  Wave      Nerve Fmin Ref.    ms ms   L Tibial - AH 66.88 ?58.00       EMG Summary Table     Spontaneous MUAP Recruitment   Muscle IA Fib PSW Fasc H.F. Amp  Dur. PPP Pattern   L. Vastus lateralis N None None None None 1+ 1+ 1+ Reduced   R. Vastus lateralis N None None None None 1+ 1+ 1+ Reduced   L. Rectus femoris N None None None None 1+ 1+ 1+ Reduced   R. Rectus femoris N None None None None 1+ 1+ 1+ Reduced   L. Iliopsoas N None None None None N N N N   R. Iliopsoas N None None None None N N N N   L. Gluteus medius N None None None None N N N N   R. Gluteus medius N None None None None N N N N   L. Tibialis anterior 1+ None 2+ None None N N N Reduced   R. Tibialis anterior N None None None None N 1+ 1+ Reduced   L. Gastrocnemius (Medial head) N None None None None N N N N   R. Gastrocnemius (Medial head) N None None None None N N N N   L. Lumbar paraspinals (mid) 1+ None None None None N N N N   R. Lumbar paraspinals (mid) 1+ None None None None N N N N   L. Lumbar paraspinals (low) 1+ None None None None N N N N   R. Lumbar paraspinals (low) 1+ None None None None N N N N       Summary    The motor conduction test had results outside of the specified normal range in all 4 of the tested nerves:  In the R Peroneal - EDB study  the peak amplitude result was reduced for Ankle stimulation  the take off velocity result was reduced for Fib head - Ankle segment  In the L Peroneal - EDB study  the peak amplitude result was reduced for Ankle stimulation  the take off velocity result was reduced for Fib head - Ankle segment  In the R Tibial - AH study  the peak amplitude result was reduced for Ankle stimulation  In the L Tibial - AH study  the peak amplitude result was reduced for Ankle stimulation  the take off velocity result was reduced for Pop fossa - Ankle segment    The sensory conduction test was performed on 4 nerve(s). The results were normal in 1 nerve(s): R Superficial peroneal - Ankle. Results outside the specified normal range were found in 3 nerve(s), as follows:  In the R Sural - Ankle (Calf) study  the peak latency result was increased for Calf stimulation  the  peak amplitude result was reduced for Calf stimulation  In the L Sural - Ankle (Calf) study  the response was considered absent for Calf stimulation  In the L Superficial peroneal - Ankle study  the response was considered absent for Lat leg stimulation    The F wave study was performed on 2 nerve(s). There were no results within the specified normal range. Findings were unremarkable in 1 nerve(s): R Tibial - AH. Results outside the specified normal range were found in 1 nerve(s), as follows:  In the L Tibial - AH study  cursor 1 latency result was increased    The needle EMG examination was performed in 16 muscles. It was normal in 6 muscle(s): L. Iliopsoas, R. Iliopsoas, L. Gluteus medius, R. Gluteus medius, L. Gastrocnemius (Medial head), R. Gastrocnemius (Medial head). The study was abnormal in 10 muscle(s), with the following distribution:  Abnormal spontaneous/insertional activity was found in L. Tibialis anterior, L. Lumbar paraspinals (mid), R. Lumbar paraspinals (mid), L. Lumbar paraspinals (low), R. Lumbar paraspinals (low).  The MUP waveform abnormality was found in L. Vastus lateralis, R. Vastus lateralis, L. Rectus femoris, R. Rectus femoris, R. Tibialis anterior.  Abnormal interference pattern was found in L. Vastus lateralis, R. Vastus lateralis, L. Rectus femoris, R. Rectus femoris, L. Tibialis anterior, R. Tibialis anterior.        Israel Perdue [id number] 9/20/2022 13:01     1 of 1    Conclusion:     Moderate chronic bilateral L4/L5 radiculopathies with active denervation noted in the left tibialis anterior  Superimposed sensorimotor predominantly axonal neuropathy          ____________________________  Suzy Vasquez [id number] 9/20/2022 13:01     1 of 1

## 2022-09-20 NOTE — TELEPHONE ENCOUNTER
Spoke with patient he would like to cancel his procedure because he cannot afford it. Msg sent to Franck pierre at [facility] and removed from calendar